# Patient Record
Sex: FEMALE | Race: BLACK OR AFRICAN AMERICAN | Employment: UNEMPLOYED | URBAN - METROPOLITAN AREA
[De-identification: names, ages, dates, MRNs, and addresses within clinical notes are randomized per-mention and may not be internally consistent; named-entity substitution may affect disease eponyms.]

---

## 2019-06-23 ENCOUNTER — HOSPITAL ENCOUNTER (EMERGENCY)
Age: 24
Discharge: HOME | End: 2019-06-23
Attending: EMERGENCY MEDICINE
Payer: COMMERCIAL

## 2019-06-23 ENCOUNTER — APPOINTMENT (OUTPATIENT)
Dept: GENERAL RADIOLOGY | Age: 24
End: 2019-06-23
Payer: COMMERCIAL

## 2019-06-23 VITALS
RESPIRATION RATE: 16 BRPM | TEMPERATURE: 98.6 F | WEIGHT: 107.14 LBS | BODY MASS INDEX: 18.39 KG/M2 | HEART RATE: 88 BPM | DIASTOLIC BLOOD PRESSURE: 76 MMHG | SYSTOLIC BLOOD PRESSURE: 107 MMHG | OXYGEN SATURATION: 100 %

## 2019-06-23 DIAGNOSIS — S93.402A SPRAIN OF LEFT ANKLE, UNSPECIFIED LIGAMENT, INITIAL ENCOUNTER: Primary | ICD-10-CM

## 2019-06-23 DIAGNOSIS — S90.32XA CONTUSION OF LEFT FOOT, INITIAL ENCOUNTER: ICD-10-CM

## 2019-06-23 PROCEDURE — 6370000000 HC RX 637 (ALT 250 FOR IP): Performed by: EMERGENCY MEDICINE

## 2019-06-23 PROCEDURE — 99283 EMERGENCY DEPT VISIT LOW MDM: CPT

## 2019-06-23 PROCEDURE — 73610 X-RAY EXAM OF ANKLE: CPT

## 2019-06-23 RX ORDER — TRAMADOL HYDROCHLORIDE 50 MG/1
50 TABLET ORAL ONCE
Status: COMPLETED | OUTPATIENT
Start: 2019-06-23 | End: 2019-06-23

## 2019-06-23 RX ORDER — TRAMADOL HYDROCHLORIDE 50 MG/1
50 TABLET ORAL EVERY 6 HOURS PRN
Qty: 12 TABLET | Refills: 0 | Status: SHIPPED | OUTPATIENT
Start: 2019-06-23 | End: 2019-06-26

## 2019-06-23 RX ORDER — NAPROXEN 500 MG/1
500 TABLET ORAL 2 TIMES DAILY
Qty: 20 TABLET | Refills: 0 | Status: SHIPPED | OUTPATIENT
Start: 2019-06-23 | End: 2019-09-20

## 2019-06-23 RX ADMIN — TRAMADOL HYDROCHLORIDE 50 MG: 50 TABLET, FILM COATED ORAL at 22:26

## 2019-06-23 ASSESSMENT — PAIN DESCRIPTION - PROGRESSION: CLINICAL_PROGRESSION: GRADUALLY WORSENING

## 2019-06-23 ASSESSMENT — PAIN - FUNCTIONAL ASSESSMENT: PAIN_FUNCTIONAL_ASSESSMENT: 0-10

## 2019-06-23 ASSESSMENT — PAIN DESCRIPTION - PAIN TYPE
TYPE: ACUTE PAIN
TYPE: ACUTE PAIN

## 2019-06-23 ASSESSMENT — PAIN SCALES - GENERAL
PAINLEVEL_OUTOF10: 10
PAINLEVEL_OUTOF10: 7
PAINLEVEL_OUTOF10: 10

## 2019-06-23 ASSESSMENT — PAIN DESCRIPTION - DESCRIPTORS: DESCRIPTORS: THROBBING

## 2019-06-23 ASSESSMENT — PAIN DESCRIPTION - FREQUENCY: FREQUENCY: CONTINUOUS

## 2019-06-23 ASSESSMENT — PAIN DESCRIPTION - LOCATION: LOCATION: ANKLE

## 2019-06-23 ASSESSMENT — PAIN DESCRIPTION - ORIENTATION: ORIENTATION: RIGHT

## 2019-06-24 NOTE — ED PROVIDER NOTES
GC?        GENERAL:  Awake, alert. Well developed, well nourished with no apparent distress. HENT:  Normocephalic, Atraumatic, moist mucous membranes. EYES:  Pupils equal round and reactive to light, Conjunctiva normal, extraocular movements normal.  NECK:  No meningeal signs, Supple. CHEST:  Regular rate and rhythm, chest wall non-tender. LUNGS:  Clear to auscultation bilaterally, no respiratory distress. ABDOMEN:  Soft, non-tender, non-distended, normal bowel sounds. No costovertebral angle tenderness to palpation. EXTREMITIES:  no edema, tender to palpation along the anterior medial aspect of the left ankle with some minor bruising. No palpable step-off. Normal range of motion. No deformity, distal pulses present. DP/PT pulse 2+ equal bilaterally  BACK:  No tenderness. SKIN: Warm, dry and intact. NEUROLOGIC: Normal mental status. Moving all extremities to command. RADIOLOGY  X-RAYS:  I have reviewed radiologic plain film image(s). ALL OTHER NON-PLAIN FILM IMAGES SUCH AS CT, ULTRASOUND AND MRI HAVE BEEN READ BY THE RADIOLOGIST. XR ANKLE LEFT (MIN 3 VIEWS)   Final Result   No evidence of an acute fracture. LABS  No results found for this visit on 06/23/19. PROCEDURES      MEDICAL DECISION MAKING  On arrival to the emergency department, patient afebrile with otherwise normal vital signs. No neurological deficit appreciated on exam.  Patient was given a tramadol for pain control here in the emergency department. X-ray of the left ankle was obtained demonstrating no acute any acute osseous abnormality underlying injury. Patient likely left ankle sprain and contusion at this time. Patient was given a prescription for naproxen as well as tramadol for breakthrough pain. Patient was also given an Aircast as well as crutches here in the emergency department to help with ambulation.   Patient follow-up with PCP for reevaluation further management of current symptoms in 3 to 5 days.      Final diagnoses:   Sprain of left ankle, unspecified ligament, initial encounter   Contusion of left foot, initial encounter         Patient was given scripts for the following medications. I counseled patient how to take these medications. New Prescriptions    NAPROXEN (NAPROSYN) 500 MG TABLET    Take 1 tablet by mouth 2 times daily for 10 days    TRAMADOL (ULTRAM) 50 MG TABLET    Take 1 tablet by mouth every 6 hours as needed for Pain (breakthrough pain) for up to 3 days. Disposition  Pt is in stable condition upon Discharge to home. This chart was generated using the [address] [place] [address] [address] dictation system. I created this record but it may contain dictation errors.             Laura Rodriguez MD  06/24/19 2706

## 2019-06-24 NOTE — ED NOTES
Patient remains in pain but states now that she knows it's only sprained the pain is tolerable, EMD aware, patient discharged     Estrada Casper RN  06/23/19 2462

## 2019-09-20 ENCOUNTER — HOSPITAL ENCOUNTER (EMERGENCY)
Age: 24
Discharge: HOME | End: 2019-09-20
Payer: COMMERCIAL

## 2019-09-20 VITALS
HEART RATE: 88 BPM | BODY MASS INDEX: 18.52 KG/M2 | WEIGHT: 108.47 LBS | OXYGEN SATURATION: 100 % | DIASTOLIC BLOOD PRESSURE: 57 MMHG | RESPIRATION RATE: 15 BRPM | TEMPERATURE: 98.6 F | SYSTOLIC BLOOD PRESSURE: 103 MMHG | HEIGHT: 64 IN

## 2019-09-20 DIAGNOSIS — N92.6 IRREGULAR MENSTRUAL CYCLE: ICD-10-CM

## 2019-09-20 DIAGNOSIS — Z32.02 NEGATIVE PREGNANCY TEST: Primary | ICD-10-CM

## 2019-09-20 LAB
BACTERIA: ABNORMAL /HPF
BILIRUBIN URINE: NEGATIVE
BLOOD, URINE: ABNORMAL
CLARITY: ABNORMAL
COLOR: YELLOW
EPITHELIAL CELLS, UA: ABNORMAL /HPF
GLUCOSE URINE: NEGATIVE MG/DL
HCG(URINE) PREGNANCY TEST: NEGATIVE
KETONES, URINE: ABNORMAL MG/DL
LEUKOCYTE ESTERASE, URINE: NEGATIVE
MICROSCOPIC EXAMINATION: YES
NITRITE, URINE: NEGATIVE
PH UA: 6.5 (ref 5–8)
PROTEIN UA: NEGATIVE MG/DL
RBC UA: ABNORMAL /HPF (ref 0–2)
SPECIFIC GRAVITY UA: 1.02 (ref 1–1.03)
URINE REFLEX TO CULTURE: ABNORMAL
URINE TYPE: ABNORMAL
UROBILINOGEN, URINE: 0.2 E.U./DL
WBC UA: ABNORMAL /HPF (ref 0–5)

## 2019-09-20 PROCEDURE — 99282 EMERGENCY DEPT VISIT SF MDM: CPT

## 2019-09-20 PROCEDURE — 81001 URINALYSIS AUTO W/SCOPE: CPT

## 2019-09-20 PROCEDURE — 84703 CHORIONIC GONADOTROPIN ASSAY: CPT

## 2019-09-20 ASSESSMENT — PAIN SCALES - GENERAL: PAINLEVEL_OUTOF10: 0

## 2019-09-20 ASSESSMENT — ENCOUNTER SYMPTOMS
ROS SKIN COMMENTS: SORE BREASTS
BACK PAIN: 0
VOMITING: 0
NAUSEA: 0
SORE THROAT: 0
ABDOMINAL PAIN: 0
SHORTNESS OF BREATH: 0

## 2019-09-20 NOTE — ED PROVIDER NOTES
[address] [email or web address]      Pt Name: Bessy Swann  MRN: [id number]  [place] 1995  Date of evaluation: 9/20/2019  Provider: Herberth Hutchins PA-C    This patient was not seen and evaluated by the attending physician No att. providers found. CHIEF COMPLAINT       Chief Complaint   Patient presents with    Pregnancy Test     denies abdominal pain and vaginal discharge. HISTORYOF PRESENT ILLNESS  (Location/Symptom, Timing/Onset, Context/Setting, Quality, Duration, Modifying Factors, Severity.)   Bessy Swann is a 21 y.o. female who presents to the emergency department requesting pregnancy test.  Patient states that in August her period was late but she did eventually have it from 8/28 to 9/5. Yesterday her breast became very sore and she was manipulating them while in the shower. When she got out of the shower she noticed some whitish drainage from the nipples. She started spotting today. She was concerned that she might be pregnant. She denies any abdominal pain, nausea, vomiting, discharge, urinary symptoms. Nursing Notes were reviewedand I agree. REVIEW OF SYSTEMS    (2-9 systems for level 4, 10 or more forlevel 5)     Review of Systems   Constitutional: Negative for chills and fever. HENT: Negative for sore throat. Respiratory: Negative for shortness of breath. Cardiovascular: Negative for chest pain. Gastrointestinal: Negative for abdominal pain, nausea and vomiting. Genitourinary: Negative for difficulty urinating and dysuria. Musculoskeletal: Negative for back pain. Skin: Negative for rash. Sore breasts   Neurological: Negative for light-headedness and headaches. Psychiatric/Behavioral: The patient is not nervous/anxious. All other systems reviewed and are negative. Except as noted above the remainder ofthe review of systems was reviewed and negative. PAST MEDICALHISTORY   History reviewed. No pertinent past medical history. SURGICAL HISTORY           Procedure Laterality Date     SECTION       SECTION      x2    LYMPHADENECTOMY         CURRENT MEDICATIONS       Previous Medications    No medications on file       ALLERGIES     Patient has no known allergies. FAMILY HISTORY     History reviewed. No pertinent family history. No family status information on file. SOCIAL HISTORY    reports that she has been smoking cigars. She has never used smokeless tobacco. She reports that she drinks alcohol. She reports that she does not use drugs. PHYSICAL EXAM    (up to 7 for level 4, 8 or more for level 5)     ED Triage Vitals [19 1412]   BP Temp Temp Source Pulse Resp SpO2 Height Weight   (!) 103/57 98.6 °F (37 °C) Oral 88 15 100 % 5' 4\" (1.626 m) 108 lb 7.5 oz (49.2 kg)       Physical Exam   Constitutional: She is oriented to person, place, and time. She appears well-developed and well-nourished. No distress. HENT:   Head: Normocephalic and atraumatic. Neck: Neck supple. Pulmonary/Chest: Effort normal. No respiratory distress. Musculoskeletal: Normal range of motion. Neurological: She is alert and oriented to person, place, and time. Skin: Skin is warm and dry. Psychiatric: She has a normal mood and affect. Her behavior is normal.   Nursing note and vitals reviewed.          DIAGNOSTIC RESULTS       LABS:  Labs Reviewed   URINE RT REFLEX TO CULTURE - Abnormal; Notable for the following components:       Result Value    Clarity, UA SL CLOUDY (*)     Ketones, Urine TRACE (*)     Blood, Urine MODERATE (*)     All other components within normal limits    Narrative:     Performed at:  [facility]) [facility]  [address] [facility]   Phone [phone]   MICROSCOPIC URINALYSIS - Abnormal; Notable for the following components:    Bacteria, UA 1+ (*)     All other components within normal limits    Narrative: Irregular menstrual cycle          DISPOSITION/PLAN   DISPOSITION Decision To Discharge 09/20/2019 02:45:09 PM      PATIENT REFERRED TO:  Sam Gould MD  [place] [address]  [phone]    Schedule an appointment as soon as possible for a visit         MEDICATIONS:  New Prescriptions    No medications on file       (Please note that portions of this note were completed with a voice recognition program.  Efforts were made toedit the dictations but occasionally words are mis-transcribed.)    ELVIA Dennis PA-C  09/20/19 0578

## 2020-11-29 ENCOUNTER — HOSPITAL ENCOUNTER (EMERGENCY)
Age: 25
Discharge: HOME | End: 2020-11-29
Payer: COMMERCIAL

## 2020-11-29 VITALS
BODY MASS INDEX: 19.46 KG/M2 | HEIGHT: 64 IN | OXYGEN SATURATION: 100 % | TEMPERATURE: 98 F | RESPIRATION RATE: 14 BRPM | SYSTOLIC BLOOD PRESSURE: 114 MMHG | DIASTOLIC BLOOD PRESSURE: 78 MMHG | WEIGHT: 113.98 LBS | HEART RATE: 58 BPM

## 2020-11-29 LAB
BACTERIA WET PREP: NORMAL
BACTERIA: ABNORMAL /HPF
BILIRUBIN URINE: NEGATIVE
BLOOD, URINE: ABNORMAL
CLARITY: CLEAR
CLUE CELLS: NORMAL
COLOR: YELLOW
EPITHELIAL CELLS WET PREP: NORMAL
EPITHELIAL CELLS, UA: ABNORMAL /HPF (ref 0–5)
GLUCOSE URINE: NEGATIVE MG/DL
HCG(URINE) PREGNANCY TEST: NEGATIVE
KETONES, URINE: ABNORMAL MG/DL
LEUKOCYTE ESTERASE, URINE: NEGATIVE
MICROSCOPIC EXAMINATION: YES
MUCUS: ABNORMAL /LPF
NITRITE, URINE: NEGATIVE
PH UA: 6 (ref 5–8)
PROTEIN UA: NEGATIVE MG/DL
RBC UA: ABNORMAL /HPF (ref 0–4)
RBC WET PREP: NORMAL
SOURCE WET PREP: NORMAL
SPECIFIC GRAVITY UA: >=1.03 (ref 1–1.03)
TRICHOMONAS PREP: NORMAL
URINE REFLEX TO CULTURE: ABNORMAL
URINE TYPE: ABNORMAL
UROBILINOGEN, URINE: 0.2 E.U./DL
WBC UA: ABNORMAL /HPF (ref 0–5)
WBC WET PREP: NORMAL
YEAST WET PREP: NORMAL

## 2020-11-29 PROCEDURE — 99282 EMERGENCY DEPT VISIT SF MDM: CPT

## 2020-11-29 PROCEDURE — 96372 THER/PROPH/DIAG INJ SC/IM: CPT

## 2020-11-29 PROCEDURE — 87591 N.GONORRHOEAE DNA AMP PROB: CPT

## 2020-11-29 PROCEDURE — 87210 SMEAR WET MOUNT SALINE/INK: CPT

## 2020-11-29 PROCEDURE — 6360000002 HC RX W HCPCS: Performed by: PHYSICIAN ASSISTANT

## 2020-11-29 PROCEDURE — 84703 CHORIONIC GONADOTROPIN ASSAY: CPT

## 2020-11-29 PROCEDURE — 87491 CHLMYD TRACH DNA AMP PROBE: CPT

## 2020-11-29 PROCEDURE — 81001 URINALYSIS AUTO W/SCOPE: CPT

## 2020-11-29 PROCEDURE — 6370000000 HC RX 637 (ALT 250 FOR IP): Performed by: PHYSICIAN ASSISTANT

## 2020-11-29 RX ORDER — CEFTRIAXONE SODIUM 250 MG/1
250 INJECTION, POWDER, FOR SOLUTION INTRAMUSCULAR; INTRAVENOUS ONCE
Status: COMPLETED | OUTPATIENT
Start: 2020-11-29 | End: 2020-11-29

## 2020-11-29 RX ORDER — AZITHROMYCIN 500 MG/1
1000 TABLET, FILM COATED ORAL ONCE
Status: COMPLETED | OUTPATIENT
Start: 2020-11-29 | End: 2020-11-29

## 2020-11-29 RX ADMIN — AZITHROMYCIN MONOHYDRATE 1000 MG: 500 TABLET ORAL at 20:11

## 2020-11-29 RX ADMIN — CEFTRIAXONE SODIUM 250 MG: 250 INJECTION, POWDER, FOR SOLUTION INTRAMUSCULAR; INTRAVENOUS at 20:11

## 2020-11-29 ASSESSMENT — ENCOUNTER SYMPTOMS
ABDOMINAL PAIN: 0
VOMITING: 0
NAUSEA: 0

## 2020-11-29 NOTE — ED PROVIDER NOTES
[facility] [address]  Dept: [phone]  Loc: [phone]  eMERGENCYdEPARTMENT eNCOUnter      Pt Name: Mikey Hoffamnn  MRN: [id number]  [place] 1995  Date of evaluation: 11/29/2020  Provider:Ruthann Martinez PA-C    CHIEF COMPLAINT       Chief Complaint   Patient presents with    Exposure to STD     pt reports that her partner has a STD but doesn't know which one.   no vaginal discharge, abd pain, fever. pt states because she is on her period she doesn't have any symptoms. wants to be treated for STD's. STD check    HISTORY OF PRESENT ILLNESS  (Location/Symptom, Timing/Onset, Context/Setting, Quality, Duration,Modifying Factors, Severity.)   Mikey Hoffmann is a 22 y.o. female who presents to the emergency department by private vehicle complaining of STD exposure. Patient states significant other informed her that he was having symptoms consistent with STD. He is here to be tested and treated as well per patient. She denies any physical plaints this time. Specifically denies any abdominal pain, vaginal discharge, urinary frequency/urgency/hesitancy, dysuria. Patient requesting screening and treatment. She has no other physical complaints this time. She is currently on her menstrual period. Nursing Notes were reviewedand agreed with or any disagreements were addressed in the HPI. REVIEW OF SYSTEMS    (2-9 systems for level 4, 10 or more for level 5)     Review of Systems   Constitutional: Negative for chills and fever. HENT: Negative. Gastrointestinal: Negative for abdominal pain, nausea and vomiting. Genitourinary: Negative. Negative for dysuria, vaginal bleeding and vaginal discharge. Musculoskeletal: Negative for arthralgias and myalgias. Skin: Negative. Neurological: Negative for dizziness and light-headedness.    Psychiatric/Behavioral: Negative for behavioral problems and confusion. Except as noted above the remainder of the review of systems was reviewed and negative. PAST MEDICAL HISTORY   History reviewed. No pertinent past medical history. SURGICAL HISTORY           Procedure Laterality Date     SECTION       SECTION      x2    LYMPHADENECTOMY         CURRENT MEDICATIONS     [unfilled]    ALLERGIES     Patient has no known allergies. FAMILY HISTORY     History reviewed. No pertinent family history. No family status information on file. SOCIAL HISTORY      reports that she has been smoking cigars. She has never used smokeless tobacco. She reports current alcohol use. She reports that she does not use drugs. PHYSICAL EXAM    (up to 7 for level 4, 8 or more for level 5)     ED Triage Vitals [20 1831]   Enc Vitals Group      /78      Pulse 58      Resp 14      Temp 98 °F (36.7 °C)      Temp Source Skin      SpO2 100 %      Weight 113 lb 15.7 oz (51.7 kg)      Height 5' 4\" (1.626 m)      Head Circumference       Peak Flow       Pain Score       Pain Loc       Pain Edu? Excl. in [address]? Physical Exam  Vitals signs reviewed. Constitutional:       Appearance: Normal appearance. HENT:      Head: Normocephalic and atraumatic. Neck:      Musculoskeletal: Normal range of motion and neck supple. Abdominal:      Palpations: Abdomen is soft. Tenderness: There is no abdominal tenderness. Genitourinary:     Comments: Declined, patient asymptomatic, patient elected to self swab  Musculoskeletal: Normal range of motion. Neurological:      General: No focal deficit present. Mental Status: She is alert and oriented to person, place, and time.    Psychiatric:         Mood and Affect: Mood normal.         Behavior: Behavior normal.           DIAGNOSTIC RESULTS     EKG: All EKG's are interpreted by the Emergency Department Physician who either signs or Co-signs this chart in the absence of a cardiologist.    RADIOLOGY:   Non-plain film images such as CT, Ultrasound and MRI are read by the radiologist. Plain radiographic images are visualized and preliminarilyinterpreted by the emergency physician with the below findings:    Interpretation per the Radiologist below,if available at the time of this note:    No orders to display         LABS:  Labs Reviewed   [address] DNA - Abnormal; Notable for the following components:       Result Value    C. trachomatis DNA POSITIVE (*)     N. gonorrhoeae DNA POSITIVE (*)     All other components within normal limits    Narrative:     Performed at:  [address]   Phone [phone]   URINE RT REFLEX TO CULTURE - Abnormal; Notable for the following components:    Ketones, Urine TRACE (*)     Blood, Urine LARGE (*)     All other components within normal limits    Narrative:     Performed at:  [facility]  [address] [place], [place] [facility]   Phone [phone]   MICROSCOPIC URINALYSIS - Abnormal; Notable for the following components:    Mucus, UA 2+ (*)     RBC, UA 21-50 (*)     Epithelial Cells, UA 11-20 (*)     Bacteria, UA 1+ (*)     All other components within normal limits    Narrative:     Performed at:  [facility]  [address] [place], [facility]   Phone [phone]   WET PREP, GENITAL    Narrative:     Performed at:  [address] [facility] [address] [facility]  [address] [place], [place] [facility]   Phone [phone]   PREGNANCY, URINE    Narrative:     Performed at:  [address] [facility] [address] [facility]  [address] [place], [place]   Phone [phone]       All other labs were within normal range or not returned as of this dictation.     EMERGENCY DEPARTMENT COURSE and DIFFERENTIAL DIAGNOSIS/MDM:   Vitals: Vitals:    11/29/20 1831   BP: 114/78   Pulse: 58   Resp: 14   Temp: 98 °F (36.7 °C)   TempSrc: Skin   SpO2: 100%   Weight: 113 lb 15.7 oz (51.7 kg)   Height: 5' 4\" (1.626 m)       MDM     Patient presents ED with HPI noted above. Vital signs all within normal limits. Patient with potential STD exposure. Significant other is having symptoms consistent with STD. Patient denies any physical complaints at this time. She has no abdominal pain. Patient elected to self swab. Wet prep negative. GC, chlamydia pending. Patient treat empirically for gonorrhea and chlamydia given significant other symptoms. She was given IM Rocephin or azithromycin. She was told where she can obtain test results through [address] She was told to abstain from intercourse for 2 weeks for treatment of self partner. Told inform any other individual if necessary to be treated and tested. Urine showed no infection. Urine pregnancy negative. PCP referral provided at time of discharge. Patient discharged home in stable condition. The patient tolerated their visit well. I have discussed the findings of today's workup with the patient and addressed the patient's questions and concerns. Important warning signs as well as new or worsening symptoms which would necessitate immediate return to the ED were discussed. The plan is to discharge from the ED at this time, and the patient is in stable condition. The patient acknowledged understanding is agreeable with this plan. CONSULTS:  None    PROCEDURES:  Procedures    FINAL IMPRESSION      1. STD exposure          DISPOSITION/PLAN   [unfilled]    PATIENT REFERRED TO:  [place] [address]  [phone]  Go to   If symptoms worsen    [facility]) Pre-Services  [phone]  Call in 1 week  For follow up and reevaluation. DISCHARGE MEDICATIONS:  There are no discharge medications for this patient.       (Please note that portions of this note were completed with a voice recognition program.  Efforts were made to edit the dictations but occasionally words are mis-transcribed.)    [facility]          [address] [place], [place]  11/29/20 Karson Carrion PA-C  12/04/20 [address], [place]  12/04/20 3353

## 2020-11-29 NOTE — LETTER
December 1, 2020    Samantha Washington  [address] [place] [address]  [address]    Dear Ms. Samantha Washington,    I wanted to inform you that the tests for sexually transmitted diseases (STD) performed in our emergency department came back positive. You were already treated for STDs when you were in our Emergency Department. This normally cures the STD, but not always. Some important things to remember that we wanted to remind you of:     We do not routinely checked for HIV and syphilis. You should get further testing with your primary care provider or at one of the STD clinics.  You must notify any recent sexual partners and let them know that you tested positive for an STD and they should get further evaluation and testing for STDs.  You should NOT have sex for 7 days after treatment and wait to have sex until after your symptoms are gone.  You should get follow-up testing for your STD within about 1 month.     If you do not have a doctor, here are some clinics you can use:    Sexually [place] Department  [facility], [address] [facility]  [phone]    Sexually [place] Department  [place] [id number]  [phone]    Sexually [address] [place] [address] Department  [facility], [address] [place] [address]  [phone]    [facility]  [address]  [place]  [phone]      Sincerely,     Dr. Brianna Guido  [address] [facility]

## 2020-11-30 NOTE — ED NOTES
No pain. ELVIA has reviewed test results with pt. STD teaching with pt.    meds ordered explained and being given.      Latanya Carmen RN  11/30/20 2619

## 2020-11-30 NOTE — ED NOTES
Explained self swab procedure to pt. Detailed handout given to explain procedure as well. Pt performed self swab procedure for specimens with supervision of RN. Specimens to lab. Tolerated well.     No pain     Latanya Carmen RN  11/30/20 3507

## 2020-11-30 NOTE — ED NOTES
No problem after IM antibiotic given. Ready to go home. No pain. Discharge instructions with pt. STD teaching again with pt.  [facility] STD clinic address and phone number given for a resource if needed. Explained importance of having a family doctor for regular medical care. Explained how to get a family doctor. [facility] info sheet given. [facility] clinic list given. [facility] referral line given. JOSE A Julien , phone number listed in case pt needs any further assistance.      Narinder James RN  11/30/20 1225

## 2020-12-01 LAB
C TRACH DNA GENITAL QL NAA+PROBE: POSITIVE
N. GONORRHOEAE DNA: POSITIVE